# Patient Record
(demographics unavailable — no encounter records)

---

## 2017-04-28 NOTE — NUR
RECEIVED SHIFT REPORT FROM KIA APARICIO. PATIENT RESTING IN BED AND APPEARS TO BE
IN NO APPARENT DISTRESS. WILL CONTINUE TO MONITOR.

## 2017-04-28 NOTE — NUR
PT ARRIVED TO FLOOR AT THIS TIME VIA IDANIA ACCOMPANIED BY LORETTA RN; PT AMB
TO SCALE AND BATHROOM WITH STEADY GAIT; PT C/O HEADACHE RATING 6 OUT OF 10;
ASSESSMENT COMPLETED AT THIS TIME; B/P READING 198/110 MANUAL; DR CEDEÑO AT
BEDSIDE TO DISCUSS POC; ORDERS FOR IV HYDRALAZINE GIVEN; TELE IN PLACE; PT
ORIENTED TO ROOM AND CALL LIGHT SYSTEM; FALL PRECAUTIONS IN PLACE; CALL LIGHT
WITHIN REACH; WILL CONTINUE TO MONITOR

## 2017-04-29 NOTE — NUR
SHIFT CHANGE REPORT FROM JERICA, ALLYSON SLEEPING BUT AROUSES TO VERBAL STIMULI,
C/O HEADACHE AT THIS TIME, WILL CONTINUE TO MONITOR AND ADDRESS CONCERNS, CALL
BELL IN REACH.

## 2017-04-29 NOTE — NUR
PATIENT RESTING QUIETLY IN BED. PATIENT APPEARS TO BE IN NO APPARENT DISTRESS.
WILL CONTINUE TO MONITOR.

## 2017-04-29 NOTE — NUR
rec'd to icu8 per stretcher from ct scan. denies chest pain. o2 cont per nc.
voided per bsc.  cardiac monitor shows sinus rhythm. #22 rfa nitro gtt
infusing @ 30mcg ns infusing @ 20cchr. #20 rac saline lock. 
came into room. pt upset & tearful. updated  on pts condition &
bed status @ [facility]. tylenol 650mg & xanax 0.25mg po given per request.

## 2017-04-29 NOTE — NUR
pacheco from [facility] transfer center called. bed rec'd. to be transferred to 8
[facility] room 811. report to be called to [phone].  satya @ [facility]
called.  will be here in 20 minutes.

## 2017-04-29 NOTE — NUR
PT REPORTS CHEST PAIN SUBSIDED, RESTING IN BED AT THIS TIME, REPORTS
HEADACHE NOT SUBSIDED, COOL COMPRESS IN PLACE, WILL CONTINUE TO MONITOR.

## 2017-04-29 NOTE — NUR
DISCUSSED WITH DR. CHEN, STATES [facility] WANTS PT WORKED UP WITH STAT CTA CHEST TO
RULE OUT PE.STST CTA ORDERED, #18 ga TO RIGHT AC PER ANDRE CAMACHO RN. PT TO CT
VIA STRETCHER AT THIS TIME.

## 2017-04-29 NOTE — NUR
HERMANN IN LAB CALLED TO REPORT ELEVATED TROP LEVEL, DR CHEN NOTIFIED, GAVE
ORDERS TO TRANSFER TO [facility], DALIA (SUPERVISOR) CAME TO UNIT AND ASSISTED WITH
PROCESS. NEW ORDERS REQUIRED FROM [facility] BEFORE ACCEPTING PT. NITRO GTT INITIATED
BY ED RN, PT LEFT UNIT VIA STRETCHER FOR CTA THEN TO BE TRANSFERRED TO ICU.
REPORT CALLED TO RENEE IN ICU.

## 2017-04-29 NOTE — NUR
DENNY FROM [facility] TRANSFER CENTER CALLED TO INFORM THAT PT WILL BE UNDER CARE OF
DR ORION MAHER AND WILL CALL AGAIN WHEN BED IS AVAILABLE.

## 2017-04-29 NOTE — NUR
DISCUSSED CASE WITH DR. CHEN. PT WITH CURRENTLY RIGHT SIDED NECK PAIN 4/10
WITH RADIATION TO HEAD. REPEAT EKG DONE STAT.
PHONED [facility] TRANSFER CENTER -[phone]. PERTINENT INFORMATION GIVEN TO
LEXIS ADAM, PT REPRESENTATIVE. STATES SHE WILL CALL [facility] TO EVALUATE FOR
NEED TO CATH EMERGENTLY.
'S CONTACT INFORMATION PROVIDED TO TRANSFER CENTER IN ANTICIPATION OF
MD TO MD CONSULT FOR ACCEPTANCE.
AEROMED HELICOPTER PUT ON STANDBY FOR POSSIBLE EMERGENT TRANSPORT TO [facility] CATH
LAB.

## 2017-04-29 NOTE — NUR
PT NAUTIOUS AND VOMITTED @ 1220, BP ELEVATED, MD NOTIFIED AND GAVE ORDERS. PT
C/O CHEST PAIN @ 1315, STAT EKG AND CXR ORDERED, MD NOTIFOED, WROTE
ADDITIONAL ORDERS, VITAL SIGNS MEASURED AND RECORDED,
WILL CONTINUE TO MONITOR, CALL BELL IN REACH.